# Patient Record
Sex: MALE | Race: WHITE | NOT HISPANIC OR LATINO | Employment: FULL TIME | URBAN - METROPOLITAN AREA
[De-identification: names, ages, dates, MRNs, and addresses within clinical notes are randomized per-mention and may not be internally consistent; named-entity substitution may affect disease eponyms.]

---

## 2018-01-01 ENCOUNTER — APPOINTMENT (OUTPATIENT)
Dept: LAB | Facility: HOSPITAL | Age: 63
End: 2018-01-01

## 2018-01-01 ENCOUNTER — OFFICE VISIT (OUTPATIENT)
Dept: SURGERY | Facility: CLINIC | Age: 63
End: 2018-01-01

## 2018-01-01 ENCOUNTER — DOCUMENTATION (OUTPATIENT)
Dept: NUTRITION | Facility: HOSPITAL | Age: 63
End: 2018-01-01

## 2018-01-01 ENCOUNTER — OFFICE VISIT (OUTPATIENT)
Dept: ONCOLOGY | Facility: CLINIC | Age: 63
End: 2018-01-01

## 2018-01-01 ENCOUNTER — APPOINTMENT (OUTPATIENT)
Dept: ONCOLOGY | Facility: CLINIC | Age: 63
End: 2018-01-01

## 2018-01-01 ENCOUNTER — TRANSCRIBE ORDERS (OUTPATIENT)
Dept: ADMINISTRATIVE | Facility: HOSPITAL | Age: 63
End: 2018-01-01

## 2018-01-01 ENCOUNTER — TELEPHONE (OUTPATIENT)
Dept: SURGERY | Facility: CLINIC | Age: 63
End: 2018-01-01

## 2018-01-01 ENCOUNTER — HOSPITAL ENCOUNTER (OUTPATIENT)
Facility: HOSPITAL | Age: 63
Setting detail: HOSPITAL OUTPATIENT SURGERY
End: 2018-04-10
Attending: INTERNAL MEDICINE | Admitting: INTERNAL MEDICINE

## 2018-01-01 ENCOUNTER — APPOINTMENT (OUTPATIENT)
Dept: CT IMAGING | Facility: HOSPITAL | Age: 63
End: 2018-01-01

## 2018-01-01 ENCOUNTER — ANESTHESIA (OUTPATIENT)
Dept: GASTROENTEROLOGY | Facility: HOSPITAL | Age: 63
End: 2018-01-01

## 2018-01-01 ENCOUNTER — APPOINTMENT (OUTPATIENT)
Dept: MRI IMAGING | Facility: HOSPITAL | Age: 63
End: 2018-01-01

## 2018-01-01 ENCOUNTER — CONSULT (OUTPATIENT)
Dept: ONCOLOGY | Facility: CLINIC | Age: 63
End: 2018-01-01

## 2018-01-01 ENCOUNTER — ANESTHESIA EVENT (OUTPATIENT)
Dept: GASTROENTEROLOGY | Facility: HOSPITAL | Age: 63
End: 2018-01-01

## 2018-01-01 ENCOUNTER — LAB (OUTPATIENT)
Dept: LAB | Facility: HOSPITAL | Age: 63
End: 2018-01-01

## 2018-01-01 ENCOUNTER — HOSPITAL ENCOUNTER (OUTPATIENT)
Facility: HOSPITAL | Age: 63
Setting detail: OBSERVATION
Discharge: HOME | End: 2018-04-01
Attending: HOSPITALIST | Admitting: INTERNAL MEDICINE

## 2018-01-01 ENCOUNTER — TELEPHONE (OUTPATIENT)
Dept: GENERAL RADIOLOGY | Facility: HOSPITAL | Age: 63
End: 2018-01-01

## 2018-01-01 ENCOUNTER — APPOINTMENT (OUTPATIENT)
Dept: ONCOLOGY | Facility: HOSPITAL | Age: 63
End: 2018-01-01

## 2018-01-01 ENCOUNTER — TELEPHONE (OUTPATIENT)
Dept: GASTROENTEROLOGY | Facility: CLINIC | Age: 63
End: 2018-01-01

## 2018-01-01 ENCOUNTER — PREP FOR SURGERY (OUTPATIENT)
Dept: OTHER | Facility: HOSPITAL | Age: 63
End: 2018-01-01

## 2018-01-01 VITALS
BODY MASS INDEX: 24.68 KG/M2 | SYSTOLIC BLOOD PRESSURE: 106 MMHG | WEIGHT: 176.3 LBS | DIASTOLIC BLOOD PRESSURE: 71 MMHG | TEMPERATURE: 98.4 F | RESPIRATION RATE: 18 BRPM | OXYGEN SATURATION: 96 % | HEIGHT: 71 IN | HEART RATE: 74 BPM

## 2018-01-01 VITALS — BODY MASS INDEX: 26.18 KG/M2 | HEIGHT: 71 IN | HEART RATE: 88 BPM | OXYGEN SATURATION: 98 % | WEIGHT: 187 LBS

## 2018-01-01 VITALS
WEIGHT: 188 LBS | HEIGHT: 70 IN | DIASTOLIC BLOOD PRESSURE: 58 MMHG | RESPIRATION RATE: 14 BRPM | BODY MASS INDEX: 26.92 KG/M2 | OXYGEN SATURATION: 97 % | TEMPERATURE: 98.7 F | SYSTOLIC BLOOD PRESSURE: 106 MMHG | HEART RATE: 80 BPM

## 2018-01-01 VITALS
OXYGEN SATURATION: 97 % | DIASTOLIC BLOOD PRESSURE: 76 MMHG | WEIGHT: 186.6 LBS | TEMPERATURE: 98.4 F | HEIGHT: 70 IN | BODY MASS INDEX: 26.71 KG/M2 | RESPIRATION RATE: 16 BRPM | SYSTOLIC BLOOD PRESSURE: 112 MMHG | HEART RATE: 92 BPM

## 2018-01-01 VITALS
DIASTOLIC BLOOD PRESSURE: 81 MMHG | OXYGEN SATURATION: 97 % | WEIGHT: 183.56 LBS | BODY MASS INDEX: 26.43 KG/M2 | TEMPERATURE: 98.1 F | SYSTOLIC BLOOD PRESSURE: 138 MMHG | RESPIRATION RATE: 16 BRPM | HEART RATE: 75 BPM

## 2018-01-01 DIAGNOSIS — K86.89 PANCREATIC MASS: Primary | ICD-10-CM

## 2018-01-01 DIAGNOSIS — C25.9 PANCREATIC CANCER (HCC): Primary | ICD-10-CM

## 2018-01-01 DIAGNOSIS — K86.89 PANCREATIC MASS: ICD-10-CM

## 2018-01-01 DIAGNOSIS — C25.9 PANCREATIC CARCINOMA (HCC): ICD-10-CM

## 2018-01-01 DIAGNOSIS — E78.5 HYPERLIPIDEMIA, UNSPECIFIED HYPERLIPIDEMIA TYPE: Primary | ICD-10-CM

## 2018-01-01 DIAGNOSIS — C25.8 OVERLAPPING MALIGNANT NEOPLASM OF PANCREAS (HCC): Primary | ICD-10-CM

## 2018-01-01 DIAGNOSIS — C25.9 PANCREATIC CARCINOMA (HCC): Primary | ICD-10-CM

## 2018-01-01 LAB
ALBUMIN SERPL-MCNC: 4.6 G/DL (ref 3.5–5.2)
ALBUMIN SERPL-MCNC: 4.6 G/DL (ref 3.5–5.2)
ALBUMIN SERPL-MCNC: 4.8 G/DL (ref 3.5–5.2)
ALBUMIN/GLOB SERPL: 1.5 G/DL (ref 1.1–2.4)
ALBUMIN/GLOB SERPL: 1.6 G/DL
ALBUMIN/GLOB SERPL: 1.8 G/DL
ALP SERPL-CCNC: 69 U/L (ref 39–117)
ALP SERPL-CCNC: 69 U/L (ref 39–117)
ALP SERPL-CCNC: 70 U/L (ref 38–116)
ALT SERPL W P-5'-P-CCNC: 18 U/L (ref 1–41)
ALT SERPL W P-5'-P-CCNC: 19 U/L (ref 1–41)
ALT SERPL W P-5'-P-CCNC: 24 U/L (ref 0–41)
ANION GAP SERPL CALCULATED.3IONS-SCNC: 12.3 MMOL/L
ANION GAP SERPL CALCULATED.3IONS-SCNC: 13.6 MMOL/L
ANION GAP SERPL CALCULATED.3IONS-SCNC: 13.8 MMOL/L
ANION GAP SERPL CALCULATED.3IONS-SCNC: 13.9 MMOL/L
ANION GAP SERPL CALCULATED.3IONS-SCNC: 16.2 MMOL/L
APTT PPP: 26 SECONDS (ref 22.7–35.4)
AST SERPL-CCNC: 14 U/L (ref 1–40)
AST SERPL-CCNC: 15 U/L (ref 1–40)
AST SERPL-CCNC: 17 U/L (ref 0–40)
BASOPHILS # BLD AUTO: 0.02 10*3/MM3 (ref 0–0.2)
BASOPHILS # BLD AUTO: 0.03 10*3/MM3 (ref 0–0.1)
BASOPHILS NFR BLD AUTO: 0.2 % (ref 0–1.5)
BASOPHILS NFR BLD AUTO: 0.4 % (ref 0–1.1)
BILIRUB SERPL-MCNC: 0.5 MG/DL (ref 0.1–1.2)
BUN BLD-MCNC: 10 MG/DL (ref 8–23)
BUN BLD-MCNC: 10 MG/DL (ref 8–23)
BUN BLD-MCNC: 7 MG/DL (ref 6–20)
BUN BLD-MCNC: 8 MG/DL (ref 8–23)
BUN BLD-MCNC: 9 MG/DL (ref 8–23)
BUN/CREAT SERPL: 10.4 (ref 7–25)
BUN/CREAT SERPL: 10.7 (ref 7–25)
BUN/CREAT SERPL: 11.6 (ref 7–25)
BUN/CREAT SERPL: 11.8 (ref 7–25)
BUN/CREAT SERPL: 9.3 (ref 7.3–30)
CALCIUM SPEC-SCNC: 10 MG/DL (ref 8.5–10.2)
CALCIUM SPEC-SCNC: 10 MG/DL (ref 8.6–10.5)
CALCIUM SPEC-SCNC: 10.1 MG/DL (ref 8.6–10.5)
CALCIUM SPEC-SCNC: 10.2 MG/DL (ref 8.6–10.5)
CALCIUM SPEC-SCNC: 9.8 MG/DL (ref 8.6–10.5)
CANCER AG19-9 SERPL-ACNC: 446.5 U/ML
CANCER AG19-9 SERPL-ACNC: 525.7 U/ML
CHLORIDE SERPL-SCNC: 101 MMOL/L (ref 98–107)
CHLORIDE SERPL-SCNC: 98 MMOL/L (ref 98–107)
CHLORIDE SERPL-SCNC: 98 MMOL/L (ref 98–107)
CHLORIDE SERPL-SCNC: 99 MMOL/L (ref 98–107)
CHLORIDE SERPL-SCNC: 99 MMOL/L (ref 98–107)
CO2 SERPL-SCNC: 23.4 MMOL/L (ref 22–29)
CO2 SERPL-SCNC: 23.8 MMOL/L (ref 22–29)
CO2 SERPL-SCNC: 25.2 MMOL/L (ref 22–29)
CO2 SERPL-SCNC: 26.1 MMOL/L (ref 22–29)
CO2 SERPL-SCNC: 29.7 MMOL/L (ref 22–29)
CREAT BLD-MCNC: 0.75 MG/DL (ref 0.7–1.3)
CREAT BLD-MCNC: 0.77 MG/DL (ref 0.76–1.27)
CREAT BLD-MCNC: 0.84 MG/DL (ref 0.76–1.27)
CREAT BLD-MCNC: 0.85 MG/DL (ref 0.76–1.27)
CREAT BLD-MCNC: 0.86 MG/DL (ref 0.76–1.27)
CYTO UR: NORMAL
CYTO UR: NORMAL
DEPRECATED RDW RBC AUTO: 40.3 FL (ref 37–49)
DEPRECATED RDW RBC AUTO: 42.5 FL (ref 37–54)
DEPRECATED RDW RBC AUTO: 42.9 FL (ref 37–54)
DEPRECATED RDW RBC AUTO: 43.3 FL (ref 37–54)
EOSINOPHIL # BLD AUTO: 0.07 10*3/MM3 (ref 0–0.7)
EOSINOPHIL # BLD AUTO: 0.09 10*3/MM3 (ref 0–0.7)
EOSINOPHIL # BLD AUTO: 0.1 10*3/MM3 (ref 0–0.7)
EOSINOPHIL # BLD AUTO: 0.11 10*3/MM3 (ref 0–0.36)
EOSINOPHIL NFR BLD AUTO: 0.8 % (ref 0.3–6.2)
EOSINOPHIL NFR BLD AUTO: 1.1 % (ref 0.3–6.2)
EOSINOPHIL NFR BLD AUTO: 1.2 % (ref 0.3–6.2)
EOSINOPHIL NFR BLD AUTO: 1.3 % (ref 1–5)
ERYTHROCYTE [DISTWIDTH] IN BLOOD BY AUTOMATED COUNT: 12.3 % (ref 11.7–14.5)
ERYTHROCYTE [DISTWIDTH] IN BLOOD BY AUTOMATED COUNT: 12.5 % (ref 11.5–14.5)
ERYTHROCYTE [DISTWIDTH] IN BLOOD BY AUTOMATED COUNT: 12.6 % (ref 11.5–14.5)
ERYTHROCYTE [DISTWIDTH] IN BLOOD BY AUTOMATED COUNT: 13 % (ref 11.5–14.5)
GFR SERPL CREATININE-BSD FRML MDRD: 102 ML/MIN/1.73
GFR SERPL CREATININE-BSD FRML MDRD: 105 ML/MIN/1.73
GFR SERPL CREATININE-BSD FRML MDRD: 90 ML/MIN/1.73
GFR SERPL CREATININE-BSD FRML MDRD: 91 ML/MIN/1.73
GFR SERPL CREATININE-BSD FRML MDRD: 92 ML/MIN/1.73
GLOBULIN UR ELPH-MCNC: 2.7 GM/DL
GLOBULIN UR ELPH-MCNC: 2.9 GM/DL
GLOBULIN UR ELPH-MCNC: 3.1 GM/DL (ref 1.8–3.5)
GLUCOSE BLD-MCNC: 204 MG/DL (ref 65–99)
GLUCOSE BLD-MCNC: 204 MG/DL (ref 74–124)
GLUCOSE BLD-MCNC: 205 MG/DL (ref 65–99)
GLUCOSE BLD-MCNC: 216 MG/DL (ref 65–99)
GLUCOSE BLD-MCNC: 320 MG/DL (ref 65–99)
GLUCOSE BLDC GLUCOMTR-MCNC: 185 MG/DL (ref 70–130)
GLUCOSE BLDC GLUCOMTR-MCNC: 201 MG/DL (ref 70–130)
GLUCOSE BLDC GLUCOMTR-MCNC: 205 MG/DL (ref 70–130)
GLUCOSE BLDC GLUCOMTR-MCNC: 238 MG/DL (ref 70–130)
GLUCOSE BLDC GLUCOMTR-MCNC: 241 MG/DL (ref 70–130)
GLUCOSE BLDC GLUCOMTR-MCNC: 244 MG/DL (ref 70–130)
GLUCOSE BLDC GLUCOMTR-MCNC: 249 MG/DL (ref 70–130)
GLUCOSE BLDC GLUCOMTR-MCNC: 289 MG/DL (ref 70–130)
GLUCOSE BLDC GLUCOMTR-MCNC: 295 MG/DL (ref 70–130)
HBA1C MFR BLD: 10.5 % (ref 4.8–5.6)
HCT VFR BLD AUTO: 40.4 % (ref 40–49)
HCT VFR BLD AUTO: 43.1 % (ref 40.4–52.2)
HCT VFR BLD AUTO: 44.8 % (ref 40.4–52.2)
HCT VFR BLD AUTO: 45.1 % (ref 40.4–52.2)
HGB BLD-MCNC: 13.7 G/DL (ref 13.5–16.5)
HGB BLD-MCNC: 13.9 G/DL (ref 13.7–17.6)
HGB BLD-MCNC: 14.5 G/DL (ref 13.7–17.6)
HGB BLD-MCNC: 15.1 G/DL (ref 13.7–17.6)
IMM GRANULOCYTES # BLD: 0 10*3/MM3 (ref 0–0.03)
IMM GRANULOCYTES # BLD: 0 10*3/MM3 (ref 0–0.03)
IMM GRANULOCYTES # BLD: 0.02 10*3/MM3 (ref 0–0.03)
IMM GRANULOCYTES # BLD: 0.08 10*3/MM3 (ref 0–0.03)
IMM GRANULOCYTES NFR BLD: 0 % (ref 0–0.5)
IMM GRANULOCYTES NFR BLD: 0 % (ref 0–0.5)
IMM GRANULOCYTES NFR BLD: 0.2 % (ref 0–0.5)
IMM GRANULOCYTES NFR BLD: 1 % (ref 0–0.5)
INR PPP: 0.99 (ref 0.9–1.1)
LAB AP CASE REPORT: NORMAL
LAB AP CASE REPORT: NORMAL
LAB AP DIAGNOSIS COMMENT: NORMAL
LAB AP INTRADEPARTMENTAL CONSULT: NORMAL
LAB AP NON-GYN SPECIMEN ADEQUACY: NORMAL
LYMPHOCYTES # BLD AUTO: 2.58 10*3/MM3 (ref 0.9–4.8)
LYMPHOCYTES # BLD AUTO: 2.65 10*3/MM3 (ref 0.9–4.8)
LYMPHOCYTES # BLD AUTO: 2.8 10*3/MM3 (ref 1–3.5)
LYMPHOCYTES # BLD AUTO: 2.99 10*3/MM3 (ref 0.9–4.8)
LYMPHOCYTES NFR BLD AUTO: 30.5 % (ref 19.6–45.3)
LYMPHOCYTES NFR BLD AUTO: 32.8 % (ref 19.6–45.3)
LYMPHOCYTES NFR BLD AUTO: 33.4 % (ref 20–49)
LYMPHOCYTES NFR BLD AUTO: 36.1 % (ref 19.6–45.3)
Lab: NORMAL
Lab: NORMAL
MCH RBC QN AUTO: 30.1 PG (ref 27–32.7)
MCH RBC QN AUTO: 30.1 PG (ref 27–33)
MCH RBC QN AUTO: 30.3 PG (ref 27–32.7)
MCH RBC QN AUTO: 30.9 PG (ref 27–32.7)
MCHC RBC AUTO-ENTMCNC: 32.3 G/DL (ref 32.6–36.4)
MCHC RBC AUTO-ENTMCNC: 32.4 G/DL (ref 32.6–36.4)
MCHC RBC AUTO-ENTMCNC: 33.5 G/DL (ref 32.6–36.4)
MCHC RBC AUTO-ENTMCNC: 33.9 G/DL (ref 32–35)
MCV RBC AUTO: 88.8 FL (ref 83–97)
MCV RBC AUTO: 92.2 FL (ref 79.8–96.2)
MCV RBC AUTO: 92.9 FL (ref 79.8–96.2)
MCV RBC AUTO: 94.1 FL (ref 79.8–96.2)
MONOCYTES # BLD AUTO: 0.65 10*3/MM3 (ref 0.25–0.8)
MONOCYTES # BLD AUTO: 0.67 10*3/MM3 (ref 0.2–1.2)
MONOCYTES # BLD AUTO: 0.7 10*3/MM3 (ref 0.2–1.2)
MONOCYTES # BLD AUTO: 0.78 10*3/MM3 (ref 0.2–1.2)
MONOCYTES NFR BLD AUTO: 7.7 % (ref 4–12)
MONOCYTES NFR BLD AUTO: 8.1 % (ref 5–12)
MONOCYTES NFR BLD AUTO: 8.3 % (ref 5–12)
MONOCYTES NFR BLD AUTO: 9.7 % (ref 5–12)
NEUTROPHILS # BLD AUTO: 4.52 10*3/MM3 (ref 1.9–8.1)
NEUTROPHILS # BLD AUTO: 4.54 10*3/MM3 (ref 1.9–8.1)
NEUTROPHILS # BLD AUTO: 4.72 10*3/MM3 (ref 1.5–7)
NEUTROPHILS # BLD AUTO: 5.06 10*3/MM3 (ref 1.9–8.1)
NEUTROPHILS NFR BLD AUTO: 54.8 % (ref 42.7–76)
NEUTROPHILS NFR BLD AUTO: 56 % (ref 42.7–76)
NEUTROPHILS NFR BLD AUTO: 56.2 % (ref 39–75)
NEUTROPHILS NFR BLD AUTO: 59.8 % (ref 42.7–76)
NRBC BLD MANUAL-RTO: 0.6 /100 WBC (ref 0–0)
PATH REPORT.ADDENDUM SPEC: NORMAL
PATH REPORT.ADDENDUM SPEC: NORMAL
PATH REPORT.FINAL DX SPEC: NORMAL
PATH REPORT.FINAL DX SPEC: NORMAL
PATH REPORT.GROSS SPEC: NORMAL
PATH REPORT.GROSS SPEC: NORMAL
PLATELET # BLD AUTO: 151 10*3/MM3 (ref 140–500)
PLATELET # BLD AUTO: 153 10*3/MM3 (ref 140–500)
PLATELET # BLD AUTO: 161 10*3/MM3 (ref 150–375)
PLATELET # BLD AUTO: 183 10*3/MM3 (ref 140–500)
PMV BLD AUTO: 10.9 FL (ref 8.9–12.1)
PMV BLD AUTO: 11.6 FL (ref 6–12)
PMV BLD AUTO: 11.6 FL (ref 6–12)
PMV BLD AUTO: 12 FL (ref 6–12)
POTASSIUM BLD-SCNC: 3.9 MMOL/L (ref 3.5–5.2)
POTASSIUM BLD-SCNC: 4.1 MMOL/L (ref 3.5–5.2)
POTASSIUM BLD-SCNC: 4.2 MMOL/L (ref 3.5–4.7)
POTASSIUM BLD-SCNC: 4.2 MMOL/L (ref 3.5–5.2)
POTASSIUM BLD-SCNC: 4.4 MMOL/L (ref 3.5–5.2)
PROT SERPL-MCNC: 7.5 G/DL (ref 6–8.5)
PROT SERPL-MCNC: 7.5 G/DL (ref 6–8.5)
PROT SERPL-MCNC: 7.7 G/DL (ref 6.3–8)
PROTHROMBIN TIME: 12.9 SECONDS (ref 11.7–14.2)
RBC # BLD AUTO: 4.55 10*6/MM3 (ref 4.3–5.5)
RBC # BLD AUTO: 4.58 10*6/MM3 (ref 4.6–6)
RBC # BLD AUTO: 4.82 10*6/MM3 (ref 4.6–6)
RBC # BLD AUTO: 4.89 10*6/MM3 (ref 4.6–6)
SODIUM BLD-SCNC: 138 MMOL/L (ref 136–145)
SODIUM BLD-SCNC: 139 MMOL/L (ref 136–145)
SODIUM BLD-SCNC: 140 MMOL/L (ref 134–145)
WBC NRBC COR # BLD: 8.08 10*3/MM3 (ref 4.5–10.7)
WBC NRBC COR # BLD: 8.29 10*3/MM3 (ref 4.5–10.7)
WBC NRBC COR # BLD: 8.39 10*3/MM3 (ref 4–10)
WBC NRBC COR # BLD: 8.46 10*3/MM3 (ref 4.5–10.7)

## 2018-01-01 PROCEDURE — 99214 OFFICE O/P EST MOD 30 MIN: CPT | Performed by: INTERNAL MEDICINE

## 2018-01-01 PROCEDURE — 80048 BASIC METABOLIC PNL TOTAL CA: CPT | Performed by: HOSPITALIST

## 2018-01-01 PROCEDURE — 99215 OFFICE O/P EST HI 40 MIN: CPT | Performed by: INTERNAL MEDICINE

## 2018-01-01 PROCEDURE — 0 DIATRIZOATE MEGLUMINE & SODIUM PER 1 ML: Performed by: INTERNAL MEDICINE

## 2018-01-01 PROCEDURE — 63710000001 INSULIN ASPART PER 5 UNITS: Performed by: HOSPITALIST

## 2018-01-01 PROCEDURE — G0378 HOSPITAL OBSERVATION PER HR: HCPCS

## 2018-01-01 PROCEDURE — 0 GADOBENATE DIMEGLUMINE 529 MG/ML SOLUTION: Performed by: INTERNAL MEDICINE

## 2018-01-01 PROCEDURE — 80053 COMPREHEN METABOLIC PANEL: CPT | Performed by: INTERNAL MEDICINE

## 2018-01-01 PROCEDURE — 25010000002 IOPAMIDOL 61 % SOLUTION: Performed by: INTERNAL MEDICINE

## 2018-01-01 PROCEDURE — 82962 GLUCOSE BLOOD TEST: CPT

## 2018-01-01 PROCEDURE — 74177 CT ABD & PELVIS W/CONTRAST: CPT

## 2018-01-01 PROCEDURE — 25010000002 PROPOFOL 10 MG/ML EMULSION: Performed by: ANESTHESIOLOGY

## 2018-01-01 PROCEDURE — 85025 COMPLETE CBC W/AUTO DIFF WBC: CPT | Performed by: HOSPITALIST

## 2018-01-01 PROCEDURE — 85025 COMPLETE CBC W/AUTO DIFF WBC: CPT | Performed by: INTERNAL MEDICINE

## 2018-01-01 PROCEDURE — 83036 HEMOGLOBIN GLYCOSYLATED A1C: CPT | Performed by: INTERNAL MEDICINE

## 2018-01-01 PROCEDURE — A9577 INJ MULTIHANCE: HCPCS | Performed by: INTERNAL MEDICINE

## 2018-01-01 PROCEDURE — 63710000001 INSULIN DETEMER PER 5 UNITS: Performed by: HOSPITALIST

## 2018-01-01 PROCEDURE — 86301 IMMUNOASSAY TUMOR CA 19-9: CPT | Performed by: INTERNAL MEDICINE

## 2018-01-01 PROCEDURE — 43239 EGD BIOPSY SINGLE/MULTIPLE: CPT | Performed by: INTERNAL MEDICINE

## 2018-01-01 PROCEDURE — 88305 TISSUE EXAM BY PATHOLOGIST: CPT | Performed by: INTERNAL MEDICINE

## 2018-01-01 PROCEDURE — 88312 SPECIAL STAINS GROUP 1: CPT | Performed by: INTERNAL MEDICINE

## 2018-01-01 PROCEDURE — 88173 CYTOPATH EVAL FNA REPORT: CPT | Performed by: INTERNAL MEDICINE

## 2018-01-01 PROCEDURE — 99205 OFFICE O/P NEW HI 60 MIN: CPT | Performed by: INTERNAL MEDICINE

## 2018-01-01 PROCEDURE — 74183 MRI ABD W/O CNTR FLWD CNTR: CPT

## 2018-01-01 PROCEDURE — G0463 HOSPITAL OUTPT CLINIC VISIT: HCPCS | Performed by: INTERNAL MEDICINE

## 2018-01-01 PROCEDURE — 85730 THROMBOPLASTIN TIME PARTIAL: CPT | Performed by: INTERNAL MEDICINE

## 2018-01-01 PROCEDURE — 99232 SBSQ HOSP IP/OBS MODERATE 35: CPT | Performed by: INTERNAL MEDICINE

## 2018-01-01 PROCEDURE — 36415 COLL VENOUS BLD VENIPUNCTURE: CPT | Performed by: INTERNAL MEDICINE

## 2018-01-01 PROCEDURE — 99254 IP/OBS CNSLTJ NEW/EST MOD 60: CPT | Performed by: INTERNAL MEDICINE

## 2018-01-01 PROCEDURE — 85610 PROTHROMBIN TIME: CPT | Performed by: INTERNAL MEDICINE

## 2018-01-01 PROCEDURE — 99243 OFF/OP CNSLTJ NEW/EST LOW 30: CPT | Performed by: INTERNAL MEDICINE

## 2018-01-01 PROCEDURE — 80053 COMPREHEN METABOLIC PANEL: CPT | Performed by: HOSPITALIST

## 2018-01-01 PROCEDURE — 43238 EGD US FINE NEEDLE BX/ASPIR: CPT | Performed by: INTERNAL MEDICINE

## 2018-01-01 PROCEDURE — 99244 OFF/OP CNSLTJ NEW/EST MOD 40: CPT | Performed by: SURGERY

## 2018-01-01 RX ORDER — ATORVASTATIN CALCIUM 20 MG/1
20 TABLET, FILM COATED ORAL NIGHTLY
Status: DISCONTINUED | OUTPATIENT
Start: 2018-01-01 | End: 2018-01-01 | Stop reason: HOSPADM

## 2018-01-01 RX ORDER — LIDOCAINE HYDROCHLORIDE 20 MG/ML
INJECTION, SOLUTION INFILTRATION; PERINEURAL AS NEEDED
Status: DISCONTINUED | OUTPATIENT
Start: 2018-01-01 | End: 2018-01-01 | Stop reason: SURG

## 2018-01-01 RX ORDER — CEFAZOLIN SODIUM 2 G/100ML
2 INJECTION, SOLUTION INTRAVENOUS ONCE
Status: CANCELLED | OUTPATIENT
Start: 2018-01-01 | End: 2018-01-01

## 2018-01-01 RX ORDER — NICOTINE POLACRILEX 4 MG
15 LOZENGE BUCCAL
Status: DISCONTINUED | OUTPATIENT
Start: 2018-01-01 | End: 2018-01-01 | Stop reason: HOSPADM

## 2018-01-01 RX ORDER — PROPOFOL 10 MG/ML
VIAL (ML) INTRAVENOUS CONTINUOUS PRN
Status: DISCONTINUED | OUTPATIENT
Start: 2018-01-01 | End: 2018-01-01 | Stop reason: SURG

## 2018-01-01 RX ORDER — HYDROCODONE BITARTRATE AND ACETAMINOPHEN 5; 325 MG/1; MG/1
1 TABLET ORAL EVERY 4 HOURS PRN
Status: DISCONTINUED | OUTPATIENT
Start: 2018-01-01 | End: 2018-01-01

## 2018-01-01 RX ORDER — PROPOFOL 10 MG/ML
VIAL (ML) INTRAVENOUS AS NEEDED
Status: DISCONTINUED | OUTPATIENT
Start: 2018-01-01 | End: 2018-01-01 | Stop reason: SURG

## 2018-01-01 RX ORDER — SODIUM CHLORIDE 0.9 % (FLUSH) 0.9 %
1-10 SYRINGE (ML) INJECTION AS NEEDED
Status: DISCONTINUED | OUTPATIENT
Start: 2018-01-01 | End: 2018-01-01

## 2018-01-01 RX ORDER — SODIUM CHLORIDE, SODIUM LACTATE, POTASSIUM CHLORIDE, CALCIUM CHLORIDE 600; 310; 30; 20 MG/100ML; MG/100ML; MG/100ML; MG/100ML
1000 INJECTION, SOLUTION INTRAVENOUS CONTINUOUS
Status: DISCONTINUED | OUTPATIENT
Start: 2018-01-01 | End: 2018-01-01 | Stop reason: HOSPADM

## 2018-01-01 RX ORDER — PROPOFOL 10 MG/ML
VIAL (ML) INTRAVENOUS CONTINUOUS PRN
Status: DISCONTINUED | OUTPATIENT
Start: 2018-01-01 | End: 2018-01-01

## 2018-01-01 RX ORDER — DEXTROSE MONOHYDRATE 25 G/50ML
25 INJECTION, SOLUTION INTRAVENOUS
Status: DISCONTINUED | OUTPATIENT
Start: 2018-01-01 | End: 2018-01-01 | Stop reason: HOSPADM

## 2018-01-01 RX ORDER — SITAGLIPTIN 100 MG/1
100 TABLET, FILM COATED ORAL DAILY
Refills: 0 | COMMUNITY
Start: 2018-01-01

## 2018-01-01 RX ORDER — ALPRAZOLAM 0.5 MG/1
0.5 TABLET ORAL 4 TIMES DAILY PRN
Status: DISCONTINUED | OUTPATIENT
Start: 2018-01-01 | End: 2018-01-01 | Stop reason: HOSPADM

## 2018-01-01 RX ORDER — ACETAMINOPHEN 325 MG/1
650 TABLET ORAL EVERY 6 HOURS PRN
COMMUNITY

## 2018-01-01 RX ORDER — ACETAMINOPHEN 325 MG/1
650 TABLET ORAL EVERY 4 HOURS PRN
Status: DISCONTINUED | OUTPATIENT
Start: 2018-01-01 | End: 2018-01-01 | Stop reason: HOSPADM

## 2018-01-01 RX ORDER — CHOLECALCIFEROL (VITAMIN D3) 125 MCG
5 CAPSULE ORAL NIGHTLY PRN
Status: DISCONTINUED | OUTPATIENT
Start: 2018-01-01 | End: 2018-01-01 | Stop reason: HOSPADM

## 2018-01-01 RX ADMIN — PROPOFOL 180 MCG/KG/MIN: 10 INJECTION, EMULSION INTRAVENOUS at 10:34

## 2018-01-01 RX ADMIN — INSULIN ASPART 3 UNITS: 100 INJECTION, SOLUTION INTRAVENOUS; SUBCUTANEOUS at 22:00

## 2018-01-01 RX ADMIN — PROPOFOL 30 MG: 10 INJECTION, EMULSION INTRAVENOUS at 10:38

## 2018-01-01 RX ADMIN — GADOBENATE DIMEGLUMINE 16 ML: 529 INJECTION, SOLUTION INTRAVENOUS at 19:02

## 2018-01-01 RX ADMIN — INSULIN DETEMIR 5 UNITS: 100 INJECTION, SOLUTION SUBCUTANEOUS at 21:54

## 2018-01-01 RX ADMIN — ACETAMINOPHEN 650 MG: 325 TABLET ORAL at 02:13

## 2018-01-01 RX ADMIN — INSULIN ASPART 3 UNITS: 100 INJECTION, SOLUTION INTRAVENOUS; SUBCUTANEOUS at 17:28

## 2018-01-01 RX ADMIN — ACETAMINOPHEN 650 MG: 325 TABLET ORAL at 21:05

## 2018-01-01 RX ADMIN — INSULIN DETEMIR 5 UNITS: 100 INJECTION, SOLUTION SUBCUTANEOUS at 21:19

## 2018-01-01 RX ADMIN — INSULIN ASPART 3 UNITS: 100 INJECTION, SOLUTION INTRAVENOUS; SUBCUTANEOUS at 18:50

## 2018-01-01 RX ADMIN — LIDOCAINE HYDROCHLORIDE 50 MG: 20 INJECTION, SOLUTION INFILTRATION; PERINEURAL at 10:31

## 2018-01-01 RX ADMIN — INSULIN ASPART 2 UNITS: 100 INJECTION, SOLUTION INTRAVENOUS; SUBCUTANEOUS at 08:09

## 2018-01-01 RX ADMIN — INSULIN ASPART 3 UNITS: 100 INJECTION, SOLUTION INTRAVENOUS; SUBCUTANEOUS at 12:18

## 2018-01-01 RX ADMIN — INSULIN ASPART 4 UNITS: 100 INJECTION, SOLUTION INTRAVENOUS; SUBCUTANEOUS at 12:46

## 2018-01-01 RX ADMIN — PROPOFOL 160 MG: 10 INJECTION, EMULSION INTRAVENOUS at 10:31

## 2018-01-01 RX ADMIN — IOPAMIDOL 85 ML: 612 INJECTION, SOLUTION INTRAVENOUS at 11:00

## 2018-01-01 RX ADMIN — Medication 5 MG: at 21:54

## 2018-01-01 RX ADMIN — INSULIN ASPART 3 UNITS: 100 INJECTION, SOLUTION INTRAVENOUS; SUBCUTANEOUS at 09:18

## 2018-01-01 RX ADMIN — ATORVASTATIN CALCIUM 20 MG: 20 TABLET, FILM COATED ORAL at 21:05

## 2018-01-01 RX ADMIN — SODIUM CHLORIDE, POTASSIUM CHLORIDE, SODIUM LACTATE AND CALCIUM CHLORIDE 1000 ML: 600; 310; 30; 20 INJECTION, SOLUTION INTRAVENOUS at 10:13

## 2018-01-01 RX ADMIN — DIATRIZOATE MEGLUMINE AND DIATRIZOATE SODIUM 30 ML: 600; 100 SOLUTION ORAL; RECTAL at 09:18

## 2018-01-01 RX ADMIN — SODIUM CHLORIDE, POTASSIUM CHLORIDE, SODIUM LACTATE AND CALCIUM CHLORIDE: 600; 310; 30; 20 INJECTION, SOLUTION INTRAVENOUS at 10:27

## 2018-01-01 RX ADMIN — INSULIN ASPART 4 UNITS: 100 INJECTION, SOLUTION INTRAVENOUS; SUBCUTANEOUS at 21:16

## 2018-03-30 PROBLEM — K86.9 PANCREAS DISORDER: Status: ACTIVE | Noted: 2018-01-01

## 2018-03-30 PROBLEM — K86.89 PANCREATIC MASS: Status: ACTIVE | Noted: 2018-01-01

## 2018-03-30 PROBLEM — K86.9 PANCREAS DISORDER: Status: RESOLVED | Noted: 2018-01-01 | Resolved: 2018-01-01

## 2018-04-01 NOTE — PLAN OF CARE
Problem: Patient Care Overview  Goal: Plan of Care Review  Outcome: Ongoing (interventions implemented as appropriate)   04/01/18 0563   Coping/Psychosocial   Plan of Care Reviewed With patient   Plan of Care Review   Progress no change   OTHER   Outcome Summary Pt is still anxious, overwhelmed with a new diagnosis. Had MRI last night, not read yet. Melatonin given last night to help him sleep. Xanax order received but pt refused. Up ad milo. Good appetite. Wants to go home.       Problem: Oncology Care (Adult)  Goal: Signs and Symptoms of Listed Potential Problems Will be Absent, Minimized or Managed (Oncology Care)  Outcome: Ongoing (interventions implemented as appropriate)      Problem: Anxiety (Adult)  Goal: Reduction/Resolution  Outcome: Ongoing (interventions implemented as appropriate)

## 2018-04-01 NOTE — DISCHARGE SUMMARY
Name: Mk Clinton  Age: 63 y.o.  Sex: male  :  1955  MRN: [id number]         Primary Care Physician: ZOYA Wallace      Date of Admission:  3/30/2018  Date of Discharge:  2018      CHIEF COMPLAINT     Abd pain     DISCHARGE DIAGNOSIS  Active Hospital Problems (** Indicates Principal Problem)    Diagnosis Date Noted   • **Pancreatic mass [K86.9] 2018   • Diabetes mellitus [E11.9] 2016   • Hyperlipidemia [E78.5] 2016      Resolved Hospital Problems    Diagnosis Date Noted Date Resolved   • Pancreas disorder [K86.9] 2018       SECONDARY DIAGNOSES  Past Medical History:   Diagnosis Date   • Diabetes mellitus     type 2   • History of prostatitis    • Hyperlipidemia    • Osteoarthritis     knee       CONSULTS   Consulting Physician(s)     Provider Relationship Specialty    Ric Rowe MD Consulting Physician Gastroenterology    Pablo Montgomery MD Consulting Physician Hematology and Oncology          PROCEDURES PERFORMED  CT of the abdomen.  A mass in the pancreatic body is measured at 4.4 x 2.9 cm on image 40 of  series 1 common by 3.1 cm on image 55 of series 3;         HOSPITAL COURSE  This is a new admit for workup of a pancreatic mass.  Dr. Whipple asked us to admit the patient. The CT was done at  [facility].  She underwent repeat CT of the abdomen which shows pancreatic mass along with some blood products.  This was evaluated by Dr. Guillen and Dr. Esquivel.  Dr. Esquivel feels that ERCP is not feasible for tissue diagnosis but recommends ultrasound-guided biopsy.  I understand that this procedure is done only by Dr. Ric Rowe.  But unfortunately [place] is out of the [place] and only be back next Thursday.    Patient is going to be discharged home with an instruction to follow-up with Dr. Whipple and Dr Cerdareldeborah      PHYSICAL EXAM  Temp:  [98.3 °F (36.8 °C)-99.5 °F (37.5 °C)] 98.4 °F (36.9 °C)  Heart Rate:  [74-91] 74  Resp:  [18-20]  18  BP: (106-147)/(69-82) 106/71  Body mass index is 24.59 kg/m².  Physical Exam  HEENT: Unremarkable, pupils are round equal and reacting to light   NECK: No lymphadenopathy, throat is clear,   RESPRATORY SYSTEM: Breath sounds are equal on both sides and are normal, no wheezes or crackles  CARDIOVASULAR SYSTEM: Heart rate is regular without murmur  ABDOMEN: Soft, no ascites, no hepatosplenomegaly.  EXTREMITIES: No cyanosis, clubbing or edema    CONDITION ON DISCHARGE  Stable.      DISCHARGE DISPOSITION   Home      ALLERGIES  Allergies   Allergen Reactions   • Augmentin [Amoxicillin-Pot Clavulanate] Nausea And Vomiting       RECENT LABS    Results from last 7 days  Lab Units 04/01/18  0808 03/31/18  0901 03/30/18  1634   WBC 10*3/mm3 8.46 8.08 8.29   HEMOGLOBIN g/dL 14.5 15.1 13.9   HEMATOCRIT % 44.8 45.1 43.1   PLATELETS 10*3/mm3 153 183 151       Results from last 7 days  Lab Units 04/01/18  0808 03/31/18  0901 03/30/18  1634   SODIUM mmol/L 138 138 138  139   POTASSIUM mmol/L 4.4 4.1 3.9  4.2   CHLORIDE mmol/L 101 98 99  99   CO2 mmol/L 23.4 23.8 25.2  26.1   BUN mg/dL 8 9 10  10   CREATININE mg/dL 0.77 0.84 0.86  0.85   GLUCOSE mg/dL 216* 320* 204*  205*   CALCIUM mg/dL 10.0 9.8 10.2  10.1           DIET;  Diet Order   Procedures   • Diet Regular; Consistent Carbohydrate       DISCHARGE MEDICATIONS     Your medication list      CHANGE how you take these medications      Instructions Last Dose Given Next Dose Due   metFORMIN 500 MG tablet  Commonly known as:  GLUCOPHAGE  What changed:  Another medication with the same name was removed. Continue taking this medication, and follow the directions you see here.      Take 1 tablet by mouth 2 (Two) Times a Day With Meals.          CONTINUE taking these medications      Instructions Last Dose Given Next Dose Due   atorvastatin 20 MG tablet  Commonly known as:  LIPITOR      Take 1 tablet by mouth every night.       JANUVIA 100 MG tablet  Generic drug:   SITagliptin      Take 100 mg by mouth Daily.          STOP taking these medications    PRO HERBS BLOOD SUGAR BALANCE PO              No future appointments.  Follow-up Information     ZOYA Wallace Follow up.    Specialty:  Family Medicine  Contact information:  [address] [place] [address]  [phone]             Ric Rowe MD. Call.    Specialties:  Gastroenterology, Internal Medicine  Why:  Dr. Montana Esquivel reviewed his CT abd and feels that ERCP is not recommended now. He would recommend an Endoscopic US instead. Dr. Ric Rowe is out of the country and is the only doctor in our group that does Endoscopic US.  Contact information:  [address]  [address]  [facility] [id number] [phone]             Pablo Montgomery MD Follow up.    Specialties:  Hematology and Oncology, Oncology, Hematology  Contact information:  [address]  [address]  [facility] [address]  [phone]                   TEST  RESULTS PENDING AT DISCHARGE  None     CODE STATUS  Full Code        William Keen MD  [facility]  04/01/18      Time: greater than 35 minutes.

## 2018-04-01 NOTE — PROGRESS NOTES
Subjective     CHIEF COMPLAINT:     Pancreatic mass    HISTORY OF PRESENT ILLNESS:    Patient continues to have abdominal pain. He describes it as gassy type of pain located in the epigastric area. Some improvement in the appetite.       Past Medical History:   Diagnosis Date   • Diabetes mellitus     type 2   • History of prostatitis    • Hyperlipidemia    • Osteoarthritis     knee       History reviewed. No pertinent surgical history.    SCHEDULED MEDS:    atorvastatin 20 mg Oral Nightly   insulin aspart 0-7 Units Subcutaneous 4x Daily With Meals & Nightly   insulin detemir 5 Units Subcutaneous Nightly     INFUSIONS:     PRN MEDS:  •  acetaminophen  •  ALPRAZolam  •  dextrose  •  dextrose  •  glucagon (human recombinant)  •  melatonin    REVIEW OF SYSTEMS:  GENERAL:  Weight loss  GI:  Abdominal pain. Nausea. Decreased appetite.   MUSCULOSKELETAL: no back pain  PSYCHIATRIC:  Anxiety        Objective   VITAL SIGNS:  Temp:  [98.3 °F (36.8 °C)-99.5 °F (37.5 °C)] 98.4 °F (36.9 °C)  Heart Rate:  [74-91] 74  Resp:  [18-20] 18  BP: (106-147)/(69-82) 106/71    Wt Readings from Last 3 Encounters:   03/30/18 80 kg (176 lb 4.8 oz)   08/06/16 99.3 kg (219 lb)       PHYSICAL EXAMINATION:  GENERAL:  The patient appears anxious and in some pain.  SKIN:  No skin rashes or lesions. No ecchymosis or petechiae.  HEAD:  Normocephalic.  EYES:  No Jaundice. No Pallor.   ABDOMEN:  Slightly distended.   EXTREMITIES:  No Edema.   NEUROLOGICAL:  No Focal neurological deficits.      RESULT REVIEW:     Results from last 7 days  Lab Units 04/01/18  0808 03/31/18  0901 03/30/18  1634   WBC 10*3/mm3 8.46 8.08 8.29   NEUTROS ABS 10*3/mm3 5.06 4.52 4.54   HEMOGLOBIN g/dL 14.5 15.1 13.9   HEMATOCRIT % 44.8 45.1 43.1   PLATELETS 10*3/mm3 153 183 151       Results from last 7 days  Lab Units 04/01/18  0808 03/31/18  0901 03/30/18  1634   SODIUM mmol/L 138 138 138  139   POTASSIUM mmol/L 4.4 4.1 3.9  4.2   CHLORIDE mmol/L 101 98 99  99   CO2 mmol/L  23.4 23.8 25.2  26.1   BUN mg/dL 8 9 10  10   CREATININE mg/dL 0.77 0.84 0.86  0.85   CALCIUM mg/dL 10.0 9.8 10.2  10.1   ALBUMIN g/dL  --   --  4.80  4.60   BILIRUBIN mg/dL  --   --  0.5  0.5   ALK PHOS U/L  --   --  69  69   ALT (SGPT) U/L  --   --  18  19   AST (SGOT) U/L  --   --  14  15             Assessment/Plan   1.  Pancreatic body mass measuring 4.2 x 2.9 x 3.1 cm.  There are adjacent enlarged lymph nodes.  CA-19-9 level is elevated.  The findings are suspicious for primary pancreatic cancer.     2.  A total of 4 liver lesions seen on CT scan. Two are in the right and 2 are in the left lobes.  The differential diagnosis is hemangioma versus metastasis. We obtained an MRI of the abdomen but the report is pending.     Patient was seen by GI. As per Dr. Guillen's note, ERCP was not felt to be helpful in obtaining tissue for diagnosis. Endoscopic ultrasound and biopsy was recommended. If it is not going to be done today or tomorrow, I would recommend discharging the patient and arranging for the procedure as out patient. We will at our office arrange for the patient to have an outpatient PET scan and to follow up with Dr. Montgomery afterwards.        Shahzad Coombs MD  04/01/18

## 2018-04-01 NOTE — PROGRESS NOTES
[facility]  Inpatient Progress Note    Reason for Follow Up:  abnormal CT, pancreatic mass     Subjective     Interval History:   No abdominal pain.     Current Facility-Administered Medications:   •  acetaminophen (TYLENOL) tablet 650 mg, 650 mg, Oral, Q4H PRN, Kole Machado MD, 650 mg at 03/31/18 2105  •  ALPRAZolam (XANAX) tablet 0.5 mg, 0.5 mg, Oral, 4x Daily PRN, Brett Presley MD  •  atorvastatin (LIPITOR) tablet 20 mg, 20 mg, Oral, Nightly, Kole Machado MD, 20 mg at 03/31/18 2105  •  dextrose (D50W) solution 25 g, 25 g, Intravenous, Q15 Min PRN, Kole Machado MD  •  dextrose (GLUTOSE) oral gel 15 g, 15 g, Oral, Q15 Min PRN, Kole Machado MD  •  glucagon (human recombinant) (GLUCAGEN DIAGNOSTIC) injection 1 mg, 1 mg, Subcutaneous, PRN, Kole Machado MD  •  insulin aspart (novoLOG) injection 0-7 Units, 0-7 Units, Subcutaneous, 4x Daily With Meals & Nightly, Kole Machado MD, 2 Units at 04/01/18 0809  •  insulin detemir (LEVEMIR) injection 5 Units, 5 Units, Subcutaneous, Nightly, Kole Machado MD, 5 Units at 03/31/18 2154  •  melatonin tablet 5 mg, 5 mg, Oral, Nightly PRN, Brett Presley MD, 5 mg at 03/31/18 2154  Review of Systems:    The following systems were reviewed and negative;  respiratory, cardiovascular, musculoskeletal and neurological    Objective     Vital Signs  Temp:  [98.3 °F (36.8 °C)-99.5 °F (37.5 °C)] 98.4 °F (36.9 °C)  Heart Rate:  [74-91] 74  Resp:  [18-20] 18  BP: (106-147)/(69-82) 106/71  Body mass index is 24.59 kg/m².    Intake/Output Summary (Last 24 hours) at 04/01/18 1056  Last data filed at 04/01/18 0809   Gross per 24 hour   Intake              600 ml   Output             2125 ml   Net            -1525 ml     I/O this shift:  In: 240 [P.O.:240]  Out: 350 [Urine:350]     Physical Exam:   General: patient awake, alert and cooperative   Eyes: Normal lids and lashes, no scleral icterus   Neck: supple, normal  ROM   Skin: warm and dry, not jaundiced   Cardiovascular: regular rhythm and rate, no murmurs auscultated   Pulm: clear to auscultation bilaterally, regular and unlabored   Abdomen: soft, nontender, nondistended; normal bowel sounds   Rectal: deferred   Extremities: no rash or edema   Psychiatric: Normal mood and behavior; memory intact     Results Review:     I reviewed the patient's new clinical results.      Results from last 7 days  Lab Units 04/01/18  0808 03/31/18  0901 03/30/18  1634   WBC 10*3/mm3 8.46 8.08 8.29   HEMOGLOBIN g/dL 14.5 15.1 13.9   HEMATOCRIT % 44.8 45.1 43.1   PLATELETS 10*3/mm3 153 183 151       Results from last 7 days  Lab Units 04/01/18  0808 03/31/18  0901 03/30/18  1634   SODIUM mmol/L 138 138 138  139   POTASSIUM mmol/L 4.4 4.1 3.9  4.2   CHLORIDE mmol/L 101 98 99  99   CO2 mmol/L 23.4 23.8 25.2  26.1   BUN mg/dL 8 9 10  10   CREATININE mg/dL 0.77 0.84 0.86  0.85   CALCIUM mg/dL 10.0 9.8 10.2  10.1   BILIRUBIN mg/dL  --   --  0.5  0.5   ALK PHOS U/L  --   --  69  69   ALT (SGPT) U/L  --   --  18  19   AST (SGOT) U/L  --   --  14  15   GLUCOSE mg/dL 216* 320* 204*  205*         No results found for: LIPASE    Radiology:  CT Abdomen Pelvis With Contrast   Final Result           1. Pancreatic mass narrows adjacent vasculature. Evidence of adjacent   lymph node disease. Appearance suspicious for hepatic metastatic   disease. Borderline retroperitoneal lymph nodes.   2. Enlargement of the prostate.       This report was finalized on 3/31/2018 10:57 AM by Dr. Shaun Sepluveda MD.          MRI abdomen w wo contrast mrcp    (Results Pending)       Assessment/Plan     Patient Active Problem List   Diagnosis   • Diabetes mellitus   • Hyperlipidemia   • Pancreatic mass       I discussed the patients findings and my recommendations with patient.    Assessment/Recommendations:     1) Mass in body of pancreas with possible metastatic disease and elevated . Interventional  Radiology does not think they could biopsy any of this so they recommend Endoscopic US. Dr. Montana Esquivel reviewed his CT abd and feels that ERCP is not recommended now. He would recommend an Endoscopic US instead. Dr. Ric Roew is out of the country and is the only doctor in our group that does Endoscopic US. My  (Alfonso Huston) thinks that he (Dr. Rowe) is out of the country till this Thursday? I have put a call into Dr. Quan Ivory to see if he would be available to do an Endoscopic US sooner. I have not heard back from Dr. ANGIE Ivory yet. If the patient is going to be discharged today they let me know and I will see if my  could get the patient in to see Dr. ANGIE Rowe soon to discuss a possible Endoscopic US to be done as an outpatient?     Alfonso Guillen MD  [phone] (cell)

## 2018-04-03 NOTE — TELEPHONE ENCOUNTER
----- Message from Shahzad Coombs MD sent at 4/1/2018 12:16 PM EDT -----  Patient needs a PET scan for initial staging of pancreatic cancer and to see Dr. Montgomery afterwards (he had an initial consultation but cancelled due to hospitalization).    Thank you

## 2018-04-09 NOTE — PROGRESS NOTES
Subjective patient states his weight is actually improved though he still has periodic abdominal pain.    REASON FOR CONSULTATION: Apparent pancreatic malignancy   Provide an opinion on any further workup or treatment                             REQUESTING PHYSICIAN:     RECORDS OBTAINED:  Records of the patients history including those obtained from the referring provider were reviewed and summarized in detail.    HISTORY OF PRESENT ILLNESS:  The patient is a 63 y.o. year old male who is here for an opinion about the above issue.    History of Present Illness   The patient is a 63-year-old male who initially to see March 31, 2018.  It was clear at this point that diagnosis had not been made for the finding of an apparent pancreatic mass and thus it was felt he should be admitted to try to accelerate this process.           He states that he has been having abdominal pain for the past 3 months. It is in the central aspect of his abdomen. He describes it as gassy type of pain. He consulted with his PCP and had a CT scan done on 3/26/18.  This revealed a mass in the pancreatic body wiht extension along the course of the left gastric artery and hepatic artery.  There were several adjacent lymph nodes up to 8 mm concerning for local spread of the cancer. There was a 4 mm lesion in the liver that was indeterminate.      The patient continues to have the abdominal pain. It is affecting his sleep.  He has been having a problem with his blood glucose being elevated especially after he had to come of off Metformin for the CT scan.    He was seen by GI medicine as well as radiology was felt that a biopsy could not be obtained without risk and, therefore, he was to be more appropriate.  Please note a CA 19-9 on March 30 was 446.5. Unfortunately, Dr. Rowe was not available and the patient was discharged anticipating this as an outpatient.  This was also be performed  at [facility] for a 5 x 3 soft tissue density  within the distal body the pancreas near its junction with the neck.  The remainder of the PET/CT was within normal limits.    The patient is seen with his wife April 09, 2018 and we have discussed his diagnosis has still not yet been made.Unfortunately,they were further unaware of any diagnostics scheduled.we called GI and the patient is actually scheduled April 10 per endoscopic ultrasound and likely biopsy.  Past Medical History:   Diagnosis Date   • Diabetes mellitus     type 2   • History of prostatitis    • Hyperlipidemia    • Osteoarthritis     knee        No past surgical history on file.     Current Outpatient Prescriptions on File Prior to Visit   Medication Sig Dispense Refill   • atorvastatin (LIPITOR) 20 MG tablet Take 1 tablet by mouth every night. 30 tablet 5   • JANUVIA 100 MG tablet Take 100 mg by mouth Daily.  0   • metFORMIN (GLUCOPHAGE) 500 MG tablet Take 1 tablet by mouth 2 (Two) Times a Day With Meals. 60 tablet 0     No current facility-administered medications on file prior to visit.         ALLERGIES:    Allergies   Allergen Reactions   • Augmentin [Amoxicillin-Pot Clavulanate] Nausea And Vomiting        Social History     Social History   • Marital status:      Spouse name: Brittanie     Occupational History   •  [employer]     Social History Main Topics   • Smoking status: Former Smoker     Packs/day: 0.50     Types: Cigarettes     Quit date: 2015   • Smokeless tobacco: Never Used   • Alcohol use No   • Drug use: No   • Sexual activity: Defer     Other Topics Concern   • Not on file        Family History   Problem Relation Age of Onset   • Cancer Mother    • Cancer Father         Review of Systems  Constitutional: Negative for activity change and appetite change. Unexpected weight change:  He denies any but it's clear his weight has come down quite significantly over the last several months. He feels an interview April 9/10 improved somewhat since he's been home. Night  "sweats his entire life   HENT: Negative for dental problem, postnasal drip and rhinorrhea.    Respiratory: Negative for apnea and shortness of breath.    Cardiovascular: Negative for chest pain and palpitations.   Gastrointestinal: Negative for abdominal distention and abdominal pain.   Endocrine: Negative for cold intolerance and polydipsia.   Genitourinary: Negative for difficulty urinating and dysuria.   Musculoskeletal: Negative for arthralgias.   Skin: Negative for color change.   Neurological: Negative for dizziness and numbness.   Hematological: Negative for adenopathy.   Psychiatric/Behavioral: Negative for agitation and behavioral problems.   Objective     Vitals:    04/09/18 1530   BP: 112/76   Pulse: 92   Resp: 16   Temp: 98.4 °F (36.9 °C)   TempSrc: Oral   SpO2: 97%   Weight: 84.6 kg (186 lb 9.6 oz)   Height: 177.5 cm (69.88\")   PainSc: 0-No pain     Current Status 4/9/2018   ECOG score 0       Physical Exam    Constitutional: He is oriented to person, place, and time. He appears well-developed and well-nourished. No distress.   HENT:   Head: Normocephalic and atraumatic.   Eyes: Conjunctivae and EOM are normal.   Cardiovascular: Normal rate, regular rhythm and normal heart sounds.  Exam reveals no gallop and no friction rub.  No murmur heard.  Pulmonary/Chest: Effort normal and breath sounds normal. He has no wheezes. He has no rales.   Abdominal: Soft. Bowel sounds are normal. He exhibits no distension and no mass. There is no tenderness. There is no rebound and no guarding.   Musculoskeletal: He exhibits no edema.   Neurological: He is alert and oriented to person, place, and time.   Skin: Skin is warm and dry. He is not diaphoretic.   Psychiatric: He has a normal mood and affect. His behavior is normal.   RECENT LABS:  Hematology WBC   Date Value Ref Range Status   04/09/2018 8.39 4.00 - 10.00 10*3/mm3 Final     RBC   Date Value Ref Range Status   04/09/2018 4.55 4.30 - 5.50 10*6/mm3 Final "     Hemoglobin   Date Value Ref Range Status   04/09/2018 13.7 13.5 - 16.5 g/dL Final     Hematocrit   Date Value Ref Range Status   04/09/2018 40.4 40.0 - 49.0 % Final     Platelets   Date Value Ref Range Status   04/09/2018 161 150 - 375 10*3/mm3 Final      [facility] significant for a 5 x 3 soft tissue density within the distal body the pancreas near its junction with the neck.  The remainder of the PET/CT was within normal limits.    Assessment/Plan        63-year-old male with a history of diabetes mellitus and hyperlipidemia who presented to his primary physician with abdominal pain for 3 months as well as degree of weight loss.  This led to a CT scan demonstrating a mass in the pancreatic body with extension along the course left gastric artery and hepatic artery with several adjacent lymph nodes that were slightly enlarged and a 4 mm lesion in the liver that was indeterminant.  We are asked to see the patient consultation without a diagnosis there is little that could be provided and the patient was admitted to try to accelerate his assessment.  This, too, did not occur in that the patient was unable to undergo appropriate studies as result of unavailability of endoscopic ultrasound.  Now at discharge he is seen back in office and we've been able to establish that he is scheduled for endoscopic ultrasound April 10, 2018.  We have discussed that his diagnosis he is to be confirmed and, if so, he'll also need an assessment for potential surgical resection if that is possible.  Surgical assessment was not done in the hospital either.  After considerable motion-approximate 60 minutes plan:  1.  Repeat CMP, hemoglobin A1c, PT INR, PTT , CA 19-9  2.  Endoscopic ultrasound hopefully in a.m. with biopsy  3.  Referral to general surgery-next available-consideration for potential surgical resection.  4.  Follow-up appointment here 2-3 days after his surgical assessment.

## 2018-04-10 NOTE — DISCHARGE INSTRUCTIONS
For the next 24 hours patient needs to be with a responsible adult.    For 24 hours DO NOT drive, operate machinery, appliances, drink alcohol, make important decisions or sign legal documents.    You may have a sore, scratchy throat today.    Follow recommendations on procedure report provided by your doctor.    Call Dr Rowe for problems [phone]    Problems may include but not limited to: large amounts of bleeding, trouble breathing, repeated vomiting, severe unrelieved pain, fever or chills.

## 2018-04-10 NOTE — ANESTHESIA PREPROCEDURE EVALUATION
Anesthesia Evaluation     Patient summary reviewed and Nursing notes reviewed   no history of anesthetic complications:  NPO Solid Status: > 8 hours  NPO Liquid Status: > 8 hours           Airway   Mallampati: II  TM distance: >3 FB  Neck ROM: full  No difficulty expected  Dental      Comment: Missing several teeth    Pulmonary - normal exam    breath sounds clear to auscultation  (+) a smoker Former,   Cardiovascular - normal exam    Rhythm: regular  Rate: normal    (+) hyperlipidemia,       Neuro/Psych- negative ROS  GI/Hepatic/Renal/Endo    (+)   diabetes mellitus type 2,     Musculoskeletal     Abdominal  - normal exam   Substance History - negative use     OB/GYN negative ob/gyn ROS         Other   (+) arthritis                     Anesthesia Plan    ASA 2     MAC     intravenous induction   Anesthetic plan and risks discussed with patient.

## 2018-04-10 NOTE — H&P (VIEW-ONLY)
[facility]  Initial Inpatient Consult Note    Referring Provider: Dr Machado [facility]    Reason for Consultation: abnormal CT, pancreatic mass    Subjective     History of present illness:    63 y.o. male with past medical history significant for diabetes who is admitted to the hospital after an outpatient CT scan showed an abnormality in his pancreas suspicious for pancreatic mass.  He reports he has had episodic mid epigastric pain for several years which she attributed to episodic constipation.  The pain comes and goes and is not related to anything.  It is tolerable and morbid background discomfort which radiates outward to both sides.  He's had no nausea or vomiting.  His weight has been stable.  He does admit that this week he has lost a little bit of weight because he has been eating very little in an effort to manage his blood sugar for a PET scan that he thought he would be having this week.    CT scan shows a 3 x 5 cm pancreatic body mass with several peripancreatic lymph nodes.  The mass abuts the gastric and splenic arteries with possible infiltration.  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    He denies any family history of pancreatic disease or malignancy.  He is a former smoker.      Past Medical History:  Past Medical History:   Diagnosis Date   • Diabetes mellitus     type 2   • History of prostatitis    • Hyperlipidemia    • Osteoarthritis     knee     Past Surgical History:  History reviewed. No pertinent surgical history.   Social History:   Social History   Substance Use Topics   • Smoking status: Former Smoker     Packs/day: 0.50     Types: Cigarettes     Quit date: 2015   • Smokeless tobacco: Never Used   • Alcohol use No      Family History:  Family History   Problem Relation Age of Onset   • Cancer Mother    • Cancer Father        Home Meds:  Prescriptions Prior to Admission   Medication Sig Dispense Refill Last Dose   • atorvastatin (LIPITOR) 20 MG tablet Take 1 tablet  by mouth every night. 30 tablet 5 Past Week at Unknown time   • JANUVIA 100 MG tablet Take 100 mg by mouth Daily.  0 3/29/2018 at Unknown time   • metFORMIN (GLUCOPHAGE) 500 MG tablet 2 PO  tablet 5 3/29/2018 at Unknown time   • Misc Natural Products (PRO HERBS BLOOD SUGAR BALANCE PO) Take  by mouth.   Past Week at Unknown time   • metFORMIN (GLUCOPHAGE) 500 MG tablet Take 1 tablet by mouth 2 (Two) Times a Day With Meals. 60 tablet 0      Current Meds:      Allergies:  Allergies   Allergen Reactions   • Augmentin [Amoxicillin-Pot Clavulanate] Nausea And Vomiting     Review of Systems  Pertinent items are noted in HPI, all other systems reviewed and negative     Objective     Vital Signs  Temp:  [98.4 °F (36.9 °C)] 98.4 °F (36.9 °C)  Heart Rate:  [99] 99  Resp:  [20] 20  BP: (134)/(80) 134/80  Physical Exam:  General Appearance:    Alert, cooperative, in no acute distress   Head:    Normocephalic, without obvious abnormality, atraumatic   Eyes:            Lids and lashes normal, conjunctivae and sclerae normal, no   icterus   Throat:   No oral lesions, no thrush, oral mucosa moist   Neck:   No cervical adenopathy    Lungs:     Clear to auscultation,respirations regular, even and                   unlabored    Heart:    Regular rhythm and normal rate, normal S1 and S2, no            murmur   Chest Wall:    No abnormalities observed   Abdomen:     Soft, nondistended, no abdominal pain on palpation    Rectal:     Deferred   Extremities:   no edema, no cyanosis, no redness   Skin:   No bleeding, bruising or rash   Lymph nodes:   No palpable adenopathy   Psychiatric:  Judgement and insight: normal   Orientation to person place and time: normal   Mood and affect: normal   Results Review:   I reviewed the patient's new clinical results.              Invalid input(s): LABALBU, PROT      No results found for: LIPASE    Radiology:  No orders to display       Assessment/Plan   Patient Active Problem List   Diagnosis   •  Diabetes mellitus   • Hyperlipidemia   • Pancreatic mass   • Pancreas disorder     Impression-  1.  Pancreatic mass  2.  Diabetes mellitus  3.  History of tobacco abuse    Plan-  CT of the abdomen and pelvis pancreatic protocol to stage the lesion and to evaluate for vascular invasion.    Check CA-19-9    Depending on the results of the CT and even despite the results he may need an EUS for tissue diagnosis.  This be scheduled as an outpatient procedure if needed.        I discussed the patients findings and my recommendations with patient and consulting provider.    Esha Villalobos MD

## 2018-04-10 NOTE — ANESTHESIA POSTPROCEDURE EVALUATION
Patient: Mk Clinton    Procedure Summary     Date:  04/10/18 Room / Location:   [facility] /  [facility]    Anesthesia Start:  1027 Anesthesia Stop:  1114    Procedure:  ENDOSCOPIC ULTRASOUND (UPPER) WITH BIOPSY AND FNB PANCREATIC MASS (N/A ) Diagnosis:       Pancreatic mass      (Pancreatic mass [K86.9])    Surgeon:  Ric Rowe MD Provider:  Vicenta Klein MD    Anesthesia Type:  MAC ASA Status:  2          Anesthesia Type: MAC  Last vitals  BP   138/81 (04/10/18 1133)   Temp   36.7 °C (98.1 °F) (04/10/18 1133)   Pulse   75 (04/10/18 1133)   Resp   16 (04/10/18 1133)     SpO2   97 % (04/10/18 1133)     Post Anesthesia Care and Evaluation      Comments: Patient discharged before being evaluated by an Anesthesiologist. No apparent complications per the record.  This case was not medically directed. I am completing this chart for medical records purposes; I personally have no medical involvement with this patient.    /81   Pulse 75   Temp 36.7 °C (98.1 °F)   Resp 16   Wt 83.3 kg (183 lb 9 oz)   SpO2 97%   BMI 26.43 kg/m²

## 2018-04-10 NOTE — INTERVAL H&P NOTE
H&P reviewed. The patient was examined and there are no changes to the H&P.       Pt presenting today for EUS - he has large mass in body of pancreas with elevated CA 19-9.  This is concerning for primary pancreatic cancer.  PET scan shows no distal mets, but MRI suggests involvement of multiple vascular structures including celiac trunk.

## 2018-04-13 PROBLEM — C25.9 PANCREATIC CARCINOMA (HCC): Status: ACTIVE | Noted: 2018-01-01

## 2018-04-13 NOTE — PROGRESS NOTES
"  Subjective patient states \"I'm going to think about treatment\"    REASON FOR FOLLOWUP: Pancreatic carcinoma    HISTORY OF PRESENT ILLNESS:  The patient is a 63 y.o. year old male who is here for an opinion about the above issue.    History of Present Illness   The patient is a 63-year-old male who initially to see March 31, 2018.  It was clear at this point that diagnosis had not been made for the finding of an apparent pancreatic mass and thus it was felt he should be admitted to try to accelerate this process.           He states that he has been having abdominal pain for the past 3 months. It is in the central aspect of his abdomen. He describes it as gassy type of pain. He consulted with his PCP and had a CT scan done on 3/26/18.  This revealed a mass in the pancreatic body wiht extension along the course of the left gastric artery and hepatic artery.  There were several adjacent lymph nodes up to 8 mm concerning for local spread of the cancer. There was a 4 mm lesion in the liver that was indeterminate.      The patient continues to have the abdominal pain. It is affecting his sleep.  He has been having a problem with his blood glucose being elevated especially after he had to come of off Metformin for the CT scan.    He was seen by GI medicine as well as radiology was felt that a biopsy could not be obtained without risk and, therefore, he was to be more appropriate.  Please note a CA 19-9 on March 30 was 446.5. Unfortunately, Dr. Rowe was not available and the patient was discharged anticipating this as an outpatient.  This was also be performed  at [facility] for a 5 x 3 soft tissue density within the distal body the pancreas near its junction with the neck.  The remainder of the PET/CT was within normal limits.    The patient is seen with his wife April 09, 2018 and we have discussed his diagnosis has still not yet been made.Unfortunately,they were further unaware of any diagnostics " scheduled.we called GI and the patient is actually scheduled April 10 per endoscopic ultrasound and likely biopsy.  On April 10 the patient underwent endoscopic ultrasound revealing an irregular mass encompassing nearly entire neck and body of the pancreas.  It measured 32 mm x 35 mm with sonographic evidence suggesting invasion into the splenic artery (encasement), hepatic artery (encasement) and splenic vein (also by encasement).  The mass was encroaching upon the celiac axis but did not have direct abutment are invasion.  An FNA was performed with cells positive for adenocarcinoma.  Additional exams included a CA-19-9 of 525.7 and hemoglobin A1c of 10.5.  The patient was seen by Dr. Mk Mendoza of surgery who I since discussed the case with by phone and, unfortunately, Mr. Clinton does not have, and never will have, resectable disease.  The patient is now seen with his wife with a sandra discussion held about the process itself, unresectability, and realistic chances for control.  This would include initial chemotherapy, consideration of possible response, potential radiation therapy.    Past Medical History:   Diagnosis Date   • Diabetes mellitus     type 2   • History of prostatitis    • Hyperlipidemia    • Osteoarthritis     knee   • Pancreatic adenocarcinoma 04/11/2018    Pancreatic Mass, FNA/Biopsy [facility]        Past Surgical History:   Procedure Laterality Date   • UPPER ENDOSCOPIC ULTRASOUND W/ FNA N/A 04/11/2018    Pancreatic Mass, Needle Aspiration/Biopsy; PATH: POSITIVE FOR MALIGNANT CELLS, ANDENOCARCINOMA, Dr. Saman Rowe        Current Outpatient Prescriptions on File Prior to Visit   Medication Sig Dispense Refill   • acetaminophen (TYLENOL) 325 MG tablet Take 650 mg by mouth Every 6 (Six) Hours As Needed for Mild Pain .     • atorvastatin (LIPITOR) 20 MG tablet Take 1 tablet by mouth every night. 30 tablet 5   • insulin aspart (novoLOG FLEXPEN) 100 UNIT/ML solution pen-injector sc pen Inject   under the skin As Needed (Sliding scale).     • Insulin Detemir (LEVEMIR FLEXPEN SC) Inject 42 Units under the skin Daily.     • JANUVIA 100 MG tablet Take 100 mg by mouth Daily.  0   • metFORMIN (GLUCOPHAGE) 500 MG tablet Take 1 tablet by mouth 2 (Two) Times a Day With Meals. 60 tablet 0     No current facility-administered medications on file prior to visit.         ALLERGIES:    Allergies   Allergen Reactions   • Augmentin [Amoxicillin-Pot Clavulanate] Nausea And Vomiting        Social History     Social History   • Marital status:      Spouse name: Brittanie   • Years of education: High school     Occupational History   •  [employer]     Social History Main Topics   • Smoking status: Former Smoker     Packs/day: 1.00     Years: 20.00     Types: Cigarettes     Quit date: 2015   • Smokeless tobacco: Never Used   • Alcohol use Yes      Comment: Occasional   • Drug use: No   • Sexual activity: Defer     Other Topics Concern   • Not on file        Family History   Problem Relation Age of Onset   • Breast cancer Mother    • Cancer Father         Review of Systems  Constitutional: Negative for activity change and appetite change. Unexpected weight change:  He denies any but it's clear his weight has come down quite significantly over the last several months. He feels an interview April 9/10 improved somewhat since he's been home. Night sweats his entire life   HENT: Negative for dental problem, postnasal drip and rhinorrhea.    Respiratory: Negative for apnea and shortness of breath.    Cardiovascular: Negative for chest pain and palpitations.   Gastrointestinal: Negative for abdominal distention and abdominal pain.   Endocrine: Negative for cold intolerance and polydipsia.   Genitourinary: Negative for difficulty urinating and dysuria.   Musculoskeletal: Negative for arthralgias.   Skin: Negative for color change.   Neurological: Negative for dizziness and numbness.   Hematological: Negative for  adenopathy.   Psychiatric/Behavioral: Negative for agitation and behavioral problems.   Objective     There were no vitals filed for this visit.  Current Status 4/9/2018   ECOG score 0       Physical Exam    Constitutional: He is oriented to person, place, and time. He appears well-developed and well-nourished. No distress.   HENT:   Head: Normocephalic and atraumatic.   Eyes: Conjunctivae and EOM are normal.   Cardiovascular: Normal rate, regular rhythm and normal heart sounds.  Exam reveals no gallop and no friction rub.  No murmur heard.  Pulmonary/Chest: Effort normal and breath sounds normal. He has no wheezes. He has no rales.   Abdominal: Soft. Bowel sounds are normal. He exhibits no distension and no mass. There is no tenderness. There is no rebound and no guarding.   Musculoskeletal: He exhibits no edema.   Neurological: He is alert and oriented to person, place, and time.   Skin: Skin is warm and dry. He is not diaphoretic.   Psychiatric: He has a normal mood and affect. His behavior is normal.   RECENT LABS:  Hematology WBC   Date Value Ref Range Status   04/09/2018 8.39 4.00 - 10.00 10*3/mm3 Final     RBC   Date Value Ref Range Status   04/09/2018 4.55 4.30 - 5.50 10*6/mm3 Final     Hemoglobin   Date Value Ref Range Status   04/09/2018 13.7 13.5 - 16.5 g/dL Final     Hematocrit   Date Value Ref Range Status   04/09/2018 40.4 40.0 - 49.0 % Final     Platelets   Date Value Ref Range Status   04/09/2018 161 150 - 375 10*3/mm3 Final      PET/CT at [facility] significant for a 5 x 3 soft tissue density within the distal body the pancreas near its junction with the neck.  The remainder of the PET/CT was within normal limits.    Assessment/Plan        63-year-old male with a history of diabetes mellitus and hyperlipidemia who presented to his primary physician with abdominal pain for 3 months as well as degree of weight loss.  This led to a CT scan demonstrating a mass in the pancreatic body with extension along  the course left gastric artery and hepatic artery with several adjacent lymph nodes that were slightly enlarged and a 4 mm lesion in the liver that was indeterminant.  We are asked to see the patient consultation without a diagnosis there is little that could be provided and the patient was admitted to try to accelerate his assessment.  This, too, did not occur in that the patient was unable to undergo appropriate studies as result of unavailability of endoscopic ultrasound.  Now at discharge he is seen back in office and we've been able to establish that he is scheduled for endoscopic ultrasound April 10, 2018.  We have discussed that his diagnosis he is to be confirmed and, if so, he'll also need an assessment for potential surgical resection if that is possible.  Surgical assessment was not done in the hospital either.  After considerable discussion the patient was referred for both endoscopic ultrasound and surgical assessment.  The EUS was able to confirm pancreatic malignancy as well as local vessel invasion which led to a surgical opinion as to  the unresectability of his pancreatic carcinoma.     We have met today I have described treatment options which include initial chemotherapy including the possibility of Gemzar plus or minus Abraxane versus FOLFIRINOX though the patient is unlikely to become a surgical candidate and he does not want to feel significantly ill undergoing therapy.  As result we discussed proceeding with:  1.  Referral to Dr. Mendoza for port placement  2.  Teaching session for Gemzar, Abraxane  3.  Return approximate 2-3 weeks to initiate treatment as patient decides about whether he'll proceed with therapy or not  4.  Continue current treatment for diabetes, oncologic nutritional assessment

## 2018-04-14 NOTE — PROGRESS NOTES
SUMMARY (A/P):    63-year-old gentleman with locally advanced pancreatic carcinoma involving the head, neck and body.  Based on size of tumor, involvement of multiple major vessels, adjacent adenopathy, and degree of elevation of CA-19-9 level this is clearly an unresectable tumor.  I counseled him as to this finding and he has appointment tomorrow with Dr. Montgomery.  Assuming he decides to proceed with chemotherapy we will schedule him for power port placement, I discussed with him the rationale for that procedure, the nature of the procedure and the risks.      CC:    Referred for consultation by Dr. Rowe regarding pancreatic mass     HPI:    63-year-old gentleman with several week history of mild to moderate periumbilical abdominal pain radiating to the back associated with small degree of unintentional weight loss, no nausea or vomiting.    PSH:    No prior abdominal surgery    PMH:    Diabetes mellitus  Hyperlipidemia    FAMILY HISTORY:    Negative for pancreatic cancer  Negative for colorectal cancer    SOCIAL HISTORY:   Denies ongoing tobacco use, 20-pack-year history  Occasional alcohol use    ALLERGIES: reviewed, in Epic    MEDICATIONS: reviewed, in Epic    ROS:  No chest pain or shortness of air.  All other systems reviewed and negative other than presenting complaints.    RADIOLOGY/ENDOSCOPY:    -MRI abdomen 3/31/2018 demonstrated pancreatic mass involving the head, neck and body that is most likely adenocarcinoma.  The proximal splenic artery and proximal common hepatic artery are narrowed and encased by tumor.  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  No evidence of metastatic disease.  I reviewed the images and concur  -CT abdomen pelvis 3/31/2018 demonstrated pancreatic mass which narrows adjacent vasculature.  On my review of the images the mass does measure upwards of 4.5 cm and there does appear to be adjacent adenopathy.  There  appears to be involvement of multiple vessels including celiac artery, splenic blade, superior mesenteric vein, and portal vein.    LABS:    -Pathology 4/10/2018 pancreatic mass biopsy positive for malignant cells, adenocarcinoma  -CA-19-9 level 525.7  -Comprehensive metabolic panel unremarkable except slightly elevated glucose    PHYSICAL EXAM:   Constitutional: Well-developed well-nourished, no acute distress  Vital signs: Heart rate 88, weight 187 pounds, height 71 inches, BMI 26.1  Eyes: Conjunctiva normal, sclera nonicteric  ENMT: Hearing grossly normal, oral mucosa moist  Neck: Supple, no palpable mass, normal thyroid, trachea midline  Respiratory: Clear to auscultation, normal inspiratory effort  Cardiovascular: Regular rate, no murmur, no carotid bruit, no peripheral edema, no jugular venous distention  Gastrointestinal: Soft, nontender, no palpable mass, no hepatosplenomegaly, negative for hernia, bowel sounds normal  Lymphatics (palpable nodes):  cervical-negative, axillary-negative  Skin:  Warm, dry, no rash on visualized skin surfaces  Musculoskeletal: Symmetric strength, normal gait  Psychiatric: Alert and oriented ×3, normal affect     DORA LEYVA M.D.

## 2018-04-17 NOTE — TELEPHONE ENCOUNTER
----- Message from [place] Eng sent at 4/17/2018  3:38 PM EDT -----  Regarding: PT WIFE DEEPTHI CALLED - NEEDLE BIOPSY REPORT  PT REQUESTED RESULTS BE SENT TO PCP DR ZANE WHITAKER JR ON [facility].

## 2018-04-18 NOTE — PROGRESS NOTES
[facility] Nutrition     Received referral to see patient for nutrition consult.  Spoke to patient on the phone today. He tells me that he has not yet decided about treatment and listed several different supplements that he is now taking, including one called Markie rodrigues.    Let patient know that if he would like to schedule an appointment to call me back and provided number.

## (undated) DEVICE — SINGLE-USE BIOPSY FORCEPS: Brand: RADIAL JAW 4

## (undated) DEVICE — TUBING, SUCTION, 1/4" X 10', STRAIGHT: Brand: MEDLINE

## (undated) DEVICE — CANN NASL CO2 TRULINK W/O2 A/

## (undated) DEVICE — ENDOSCOPIC ULTRASOUND FINE NEEDLE BIOPSY (FNB) DEVICE: Brand: ACQUIRE

## (undated) DEVICE — BITEBLOCK OMNI BLOC